# Patient Record
Sex: FEMALE | Race: WHITE | NOT HISPANIC OR LATINO | URBAN - METROPOLITAN AREA
[De-identification: names, ages, dates, MRNs, and addresses within clinical notes are randomized per-mention and may not be internally consistent; named-entity substitution may affect disease eponyms.]

---

## 2019-04-25 ENCOUNTER — TELEPHONE (OUTPATIENT)
Dept: OBSTETRICS AND GYNECOLOGY | Facility: CLINIC | Age: 54
End: 2019-04-25

## 2019-04-25 NOTE — TELEPHONE ENCOUNTER
----- Message from Chantel Parisi sent at 4/25/2019  9:08 AM CDT -----  Contact: self  Patient will like to schedule for monopause symptoms. Patient states that she have been calling for the past week with no return call. Patient can be reached at  [phone].

## 2019-04-25 NOTE — TELEPHONE ENCOUNTER
Spoke with patient and informed her at this Dr. Sherman isn't accepting new hormone patients. Patient was told she will be placed on a wait list. Patient verbalized understanding all information